# Patient Record
Sex: FEMALE | Race: BLACK OR AFRICAN AMERICAN | NOT HISPANIC OR LATINO | Employment: FULL TIME | URBAN - METROPOLITAN AREA
[De-identification: names, ages, dates, MRNs, and addresses within clinical notes are randomized per-mention and may not be internally consistent; named-entity substitution may affect disease eponyms.]

---

## 2018-11-24 ENCOUNTER — HOSPITAL ENCOUNTER (EMERGENCY)
Facility: HOSPITAL | Age: 33
Discharge: HOME | End: 2018-11-24
Attending: EMERGENCY MEDICINE
Payer: COMMERCIAL

## 2018-11-24 VITALS
HEART RATE: 97 BPM | RESPIRATION RATE: 16 BRPM | BODY MASS INDEX: 27.89 KG/M2 | TEMPERATURE: 99 F | SYSTOLIC BLOOD PRESSURE: 122 MMHG | HEIGHT: 68 IN | WEIGHT: 184 LBS | DIASTOLIC BLOOD PRESSURE: 67 MMHG | OXYGEN SATURATION: 100 %

## 2018-11-24 DIAGNOSIS — S99.912A LEFT ANKLE INJURY: ICD-10-CM

## 2018-11-24 DIAGNOSIS — S93.402A SPRAIN OF LEFT ANKLE, UNSPECIFIED LIGAMENT, INITIAL ENCOUNTER: Primary | ICD-10-CM

## 2018-11-24 PROCEDURE — 99283 EMERGENCY DEPT VISIT LOW MDM: CPT | Mod: 25

## 2018-11-24 RX ORDER — HYDROCODONE BITARTRATE AND ACETAMINOPHEN 5; 325 MG/1; MG/1
1 TABLET ORAL EVERY 8 HOURS PRN
Qty: 12 TABLET | Refills: 0 | Status: SHIPPED | OUTPATIENT
Start: 2018-11-24 | End: 2019-08-05

## 2018-11-24 NOTE — ED PROVIDER NOTES
HISTORY     Chief Complaint   Patient presents with    Ankle Pain     left ankle pain related to fall tonight. Pt states that she is 10 wks pregnant     Review of patient's allergies indicates:  No Known Allergies     HPI   The history is provided by the patient.   Ankle Pain   This is a new problem. The current episode started 1 to 2 hours ago. The problem occurs constantly. The problem has not changed since onset.Pertinent negatives include no chest pain and no shortness of breath. The symptoms are aggravated by bending, twisting and walking.   Slip trip fall after getting off couch tonight, only c/o L ankle pain and swelling    PCP: Primary Doctor No     Past Medical History:  No past medical history on file.     Past Surgical History:  No past surgical history on file.     Family History:  No family history on file.     Social History:  Social History     Tobacco Use    Smoking status: Not on file   Substance and Sexual Activity    Alcohol use: Not on file    Drug use: Not on file    Sexual activity: Not on file         ROS   Review of Systems   Constitutional: Negative for fever.   HENT: Negative for sore throat.    Respiratory: Negative for shortness of breath.    Cardiovascular: Negative for chest pain.   Gastrointestinal: Negative for nausea.   Genitourinary: Negative for dysuria.   Musculoskeletal: Negative for back pain.        + L ankle pain and swelling     Skin: Negative for rash.   Neurological: Negative for weakness.   Hematological: Does not bruise/bleed easily.   All other systems reviewed and are negative.      PHYSICAL EXAM     Initial Vitals [11/24/18 0140]   BP Pulse Resp Temp SpO2   122/67 97 16 98.6 °F (37 °C) 100 %      MAP       --           Physical Exam    Constitutional: She appears well-developed and well-nourished. She is not diaphoretic. No distress.   HENT:   Head: Normocephalic and atraumatic.   Eyes: Conjunctivae and EOM are normal. Pupils are equal, round, and reactive to  light.   Neck: Normal range of motion. Neck supple.   Cardiovascular: Normal rate, regular rhythm and normal heart sounds.   No murmur heard.  Pulmonary/Chest: Breath sounds normal. No respiratory distress. She has no wheezes. She has no rales.   Abdominal: Soft. Bowel sounds are normal. There is no tenderness. There is no rebound and no guarding.   Musculoskeletal: She exhibits no edema.        Left ankle: She exhibits decreased range of motion and swelling. She exhibits no deformity and normal pulse. Tenderness. Lateral malleolus and medial malleolus tenderness found.        Feet:    Neurological: She is alert and oriented to person, place, and time. No cranial nerve deficit. GCS score is 15. GCS eye subscore is 4. GCS verbal subscore is 5. GCS motor subscore is 6.   Skin: Skin is warm and dry. Capillary refill takes less than 2 seconds.   Psychiatric: She has a normal mood and affect. Thought content normal.          ED COURSE   Orthopedic Injury  Date/Time: 11/24/2018 3:05 AM  Performed by: Gary Daniels Jr., FNP  Authorized by: Rod Cruz MD     Consent Done?:  Yes  Universal Protocol:     Verbal consent obtained?: Yes      Consent given by:  Patient  Injury:     Injury location:  Ankle    Location details:  Left ankle    Injury type:  Soft tissue      Pre-procedure assessment:     Neurovascular status: Neurovascularly intact      Distal perfusion: normal      Neurological function: normal      Range of motion: reduced      Local anesthesia used?: No      Patient sedated?: No        Selections made in this section will also lock the Injury type section above.:     Supplies used:  Elastic bandage  Post-procedure assessment:     Neurovascular status: Neurovascularly intact      Distal perfusion: normal      Neurological function: normal      Range of motion: unchanged      Patient tolerance:  Patient tolerated the procedure well with no immediate complications      ED ONGOING VITALS:  Vitals:     "11/24/18 0140   BP: 122/67   Pulse: 97   Resp: 16   Temp: 98.6 °F (37 °C)   TempSrc: Oral   SpO2: 100%   Weight: 83.5 kg (184 lb)   Height: 5' 8" (1.727 m)         ABNORMAL LAB VALUES:  Labs Reviewed - No data to display      ALL LAB VALUES:        RADIOLOGY STUDIES:  Imaging Results          X-Ray Ankle Complete Left (Final result)  Result time 11/24/18 06:34:06    Final result by Rod Davis III, MD (11/24/18 06:34:06)                 Impression:      No acute bony abnormality suggested.  Soft tissue swelling, greatest laterally.      Electronically signed by: Rod Davis MD  Date:    11/24/2018  Time:    06:34             Narrative:    EXAMINATION:  XR ANKLE COMPLETE 3 VIEW LEFT    CLINICAL HISTORY:  Unspecified injury of left ankle, initial encounter    COMPARISON:  None    FINDINGS:  Soft tissue swelling about the ankle, greatest laterally.  No fracture.  No dislocation.                              Type of Interpretation: ED Physician (Independently Interpreted).  Radiology Procedure Done: Left Ankle.  Interpretation: No acute fracture or dislocation noted               The above vital signs and test results have been reviewed by the emergency provider.     ED Medications:  There are no discharge medications for this patient.    Discharge Medications:  This SmartLink is deprecated. Use AVSMEDLIST instead to display the medication list for a patient.   Follow-up Information     Primary doctor In 3 days.                3:05 AM: Reassessed pt at this time.  Pt states her condition has improved at this time. Discussed with pt all pertinent ED information and results. Discussed pt dx and plan of tx. Gave pt all f/u and return to the ED instructions. All questions and concerns were addressed at this time. Pt expresses understanding of information and instructions, and is comfortable with plan to discharge. Pt is stable for discharge.    I discussed with patient and/or family/caretaker that negative " X-ray does not rule out occult fracture or other soft tissue injury.  Persistent pain greater than 7-10 days or increased pain requires follow up, specifically with orthopedics.        MEDICAL DECISION MAKING                 CLINICAL IMPRESSION       ICD-10-CM ICD-9-CM   1. Sprain of left ankle, unspecified ligament, initial encounter S93.402A 845.00   2. Left ankle injury S99.912A 959.7               Gary Daniels Jr., FNP  11/24/18 2023

## 2018-11-24 NOTE — ED NOTES
Patient identifiers verified and correct for Tigist Blanco.    Patient states a fall earlier tonight, denies LOC or head injury. C/o left ankle pain and swelling    LOC: The patient is awake, alert and aware of environment with an appropriate affect, the patient is oriented x 3 and speaking appropriately.  APPEARANCE: Patient resting comfortably and in no acute distress, patient is clean and well groomed, patient's clothing is properly fastened.  SKIN: The skin is warm and dry, color consistent with ethnicity, patient has normal skin turgor and moist mucus membranes, skin intact, no breakdown or bruising noted.  MUSCULOSKELETAL: Patient moving all extremities spontaneously, left ankle pain and swelling   RESPIRATORY: Airway is open and patent, respirations are spontaneous.  CARDIAC: Patient has a normal rate, no periphreal edema noted, capillary refill < 3 seconds.  ABDOMEN: Soft and non tender to palpation.

## 2018-11-24 NOTE — ED NOTES
Bed:  01  Expected date:   Expected time:   Means of arrival:   Comments:     Carmen Logan RN  11/24/18 0244

## 2019-08-04 ENCOUNTER — HOSPITAL ENCOUNTER (EMERGENCY)
Facility: HOSPITAL | Age: 34
Discharge: HOME | End: 2019-08-05
Attending: EMERGENCY MEDICINE
Payer: COMMERCIAL

## 2019-08-04 DIAGNOSIS — R09.1 PLEURISY: Primary | ICD-10-CM

## 2019-08-04 DIAGNOSIS — R07.9 CHEST PAIN: ICD-10-CM

## 2019-08-04 PROCEDURE — 93005 ELECTROCARDIOGRAM TRACING: CPT

## 2019-08-04 PROCEDURE — 85025 COMPLETE CBC W/AUTO DIFF WBC: CPT

## 2019-08-04 PROCEDURE — 83690 ASSAY OF LIPASE: CPT

## 2019-08-04 PROCEDURE — 96374 THER/PROPH/DIAG INJ IV PUSH: CPT

## 2019-08-04 PROCEDURE — 63600175 PHARM REV CODE 636 W HCPCS: Performed by: EMERGENCY MEDICINE

## 2019-08-04 PROCEDURE — 80053 COMPREHEN METABOLIC PANEL: CPT

## 2019-08-04 PROCEDURE — 93010 EKG 12-LEAD: ICD-10-PCS | Mod: ,,, | Performed by: INTERNAL MEDICINE

## 2019-08-04 PROCEDURE — 25000003 PHARM REV CODE 250: Performed by: EMERGENCY MEDICINE

## 2019-08-04 PROCEDURE — 99285 EMERGENCY DEPT VISIT HI MDM: CPT | Mod: 25

## 2019-08-04 PROCEDURE — 93010 ELECTROCARDIOGRAM REPORT: CPT | Mod: ,,, | Performed by: INTERNAL MEDICINE

## 2019-08-04 PROCEDURE — 84484 ASSAY OF TROPONIN QUANT: CPT

## 2019-08-04 RX ORDER — KETOROLAC TROMETHAMINE 30 MG/ML
15 INJECTION, SOLUTION INTRAMUSCULAR; INTRAVENOUS
Status: COMPLETED | OUTPATIENT
Start: 2019-08-04 | End: 2019-08-04

## 2019-08-04 RX ORDER — ASPIRIN 325 MG
325 TABLET ORAL
Status: COMPLETED | OUTPATIENT
Start: 2019-08-04 | End: 2019-08-04

## 2019-08-04 RX ORDER — ONDANSETRON 2 MG/ML
4 INJECTION INTRAMUSCULAR; INTRAVENOUS
Status: DISCONTINUED | OUTPATIENT
Start: 2019-08-04 | End: 2019-08-05 | Stop reason: HOSPADM

## 2019-08-04 RX ADMIN — KETOROLAC TROMETHAMINE 15 MG: 30 INJECTION, SOLUTION INTRAMUSCULAR at 11:08

## 2019-08-04 RX ADMIN — ASPIRIN 325 MG ORAL TABLET 325 MG: 325 PILL ORAL at 11:08

## 2019-08-05 VITALS
SYSTOLIC BLOOD PRESSURE: 145 MMHG | WEIGHT: 206.56 LBS | OXYGEN SATURATION: 100 % | TEMPERATURE: 98 F | BODY MASS INDEX: 31.3 KG/M2 | HEART RATE: 61 BPM | HEIGHT: 68 IN | RESPIRATION RATE: 20 BRPM | DIASTOLIC BLOOD PRESSURE: 89 MMHG

## 2019-08-05 LAB
ALBUMIN SERPL BCP-MCNC: 3.9 G/DL (ref 3.5–5.2)
ALP SERPL-CCNC: 84 U/L (ref 55–135)
ALT SERPL W/O P-5'-P-CCNC: 12 U/L (ref 10–44)
ANION GAP SERPL CALC-SCNC: 8 MMOL/L (ref 8–16)
AST SERPL-CCNC: 14 U/L (ref 10–40)
BASOPHILS # BLD AUTO: 0.03 K/UL (ref 0–0.2)
BASOPHILS NFR BLD: 0.5 % (ref 0–1.9)
BILIRUB SERPL-MCNC: 0.8 MG/DL (ref 0.1–1)
BUN SERPL-MCNC: 10 MG/DL (ref 6–20)
CALCIUM SERPL-MCNC: 9.1 MG/DL (ref 8.7–10.5)
CHLORIDE SERPL-SCNC: 109 MMOL/L (ref 95–110)
CO2 SERPL-SCNC: 24 MMOL/L (ref 23–29)
CREAT SERPL-MCNC: 0.9 MG/DL (ref 0.5–1.4)
DIFFERENTIAL METHOD: ABNORMAL
EOSINOPHIL # BLD AUTO: 0.4 K/UL (ref 0–0.5)
EOSINOPHIL NFR BLD: 6.5 % (ref 0–8)
ERYTHROCYTE [DISTWIDTH] IN BLOOD BY AUTOMATED COUNT: 14.9 % (ref 11.5–14.5)
EST. GFR  (AFRICAN AMERICAN): >60 ML/MIN/1.73 M^2
EST. GFR  (NON AFRICAN AMERICAN): >60 ML/MIN/1.73 M^2
GLUCOSE SERPL-MCNC: 86 MG/DL (ref 70–110)
HCT VFR BLD AUTO: 38.6 % (ref 37–48.5)
HGB BLD-MCNC: 12.1 G/DL (ref 12–16)
LIPASE SERPL-CCNC: 66 U/L (ref 4–60)
LYMPHOCYTES # BLD AUTO: 2.3 K/UL (ref 1–4.8)
LYMPHOCYTES NFR BLD: 40.1 % (ref 18–48)
MCH RBC QN AUTO: 26.4 PG (ref 27–31)
MCHC RBC AUTO-ENTMCNC: 31.3 G/DL (ref 32–36)
MCV RBC AUTO: 84 FL (ref 82–98)
MONOCYTES # BLD AUTO: 0.4 K/UL (ref 0.3–1)
MONOCYTES NFR BLD: 6.3 % (ref 4–15)
NEUTROPHILS # BLD AUTO: 2.7 K/UL (ref 1.8–7.7)
NEUTROPHILS NFR BLD: 46.6 % (ref 38–73)
PLATELET # BLD AUTO: 160 K/UL (ref 150–350)
PMV BLD AUTO: 11.7 FL (ref 9.2–12.9)
POTASSIUM SERPL-SCNC: 3.9 MMOL/L (ref 3.5–5.1)
PROT SERPL-MCNC: 7.1 G/DL (ref 6–8.4)
RBC # BLD AUTO: 4.58 M/UL (ref 4–5.4)
SODIUM SERPL-SCNC: 141 MMOL/L (ref 136–145)
TROPONIN I SERPL DL<=0.01 NG/ML-MCNC: <0.006 NG/ML (ref 0–0.03)
WBC # BLD AUTO: 5.83 K/UL (ref 3.9–12.7)

## 2019-08-05 PROCEDURE — 36415 COLL VENOUS BLD VENIPUNCTURE: CPT

## 2019-08-05 PROCEDURE — 25500020 PHARM REV CODE 255: Performed by: EMERGENCY MEDICINE

## 2019-08-05 RX ORDER — IBUPROFEN 600 MG/1
600 TABLET ORAL EVERY 8 HOURS
Refills: 1 | COMMUNITY
Start: 2019-06-27

## 2019-08-05 RX ORDER — MORPHINE SULFATE 4 MG/ML
4 INJECTION, SOLUTION INTRAMUSCULAR; INTRAVENOUS
Status: DISCONTINUED | OUTPATIENT
Start: 2019-08-05 | End: 2019-08-05 | Stop reason: HOSPADM

## 2019-08-05 RX ORDER — METHYLPREDNISOLONE 4 MG/1
TABLET ORAL
Qty: 1 PACKAGE | Refills: 0 | Status: SHIPPED | OUTPATIENT
Start: 2019-08-05

## 2019-08-05 RX ORDER — KETOROLAC TROMETHAMINE 10 MG/1
10 TABLET, FILM COATED ORAL EVERY 8 HOURS PRN
Qty: 9 TABLET | Refills: 0 | Status: SHIPPED | OUTPATIENT
Start: 2019-08-05

## 2019-08-05 RX ORDER — ONDANSETRON 2 MG/ML
4 INJECTION INTRAMUSCULAR; INTRAVENOUS
Status: DISCONTINUED | OUTPATIENT
Start: 2019-08-05 | End: 2019-08-05 | Stop reason: HOSPADM

## 2019-08-05 RX ADMIN — IOHEXOL 100 ML: 350 INJECTION, SOLUTION INTRAVENOUS at 12:08

## 2019-08-05 NOTE — DISCHARGE INSTRUCTIONS
Medrol Dosepak as anti-inflammatory.  Toradol for pain.  Follow up with her doctor upon return to use an.  Return as needed for any worsening symptoms, problems, questions or concerns

## 2019-08-05 NOTE — ED NOTES
"RN in patient room placing patient back on cardiac monitor, pulse ox and BP cuff. Patient returned from CT. Patient still states her pain is there. Patient refused Morphine and Zofran stating "I am breastfeeding and do not want to take anything". Infant son crying and patient initiates breastfeeding. Instructed patient to call for any needs. Will continue to monitor.   "

## 2019-08-05 NOTE — ED NOTES
Provided patient with discharge instructions. Explained to patient about diagnosis and prescribed medications. Patient's friend unhappy and not satisfied with discharge diagnosis and MD. RN tried to continue to explain discharge paper work and follow up with PCP. Patient's friend became loud in hallway, verbally abusive, and cursing to RN while walking out. The patient mother and friend continued to yell in lobby as witnessed by Dina in registration and BETZAIDA Will.

## 2019-08-05 NOTE — ED PROVIDER NOTES
"SCRIBE #1 NOTE: I, Pipo Durham, am scribing for, and in the presence of, Álvaro Villanueva Jr., MD. I have scribed the entire note.       History     Chief Complaint   Patient presents with    Chest Pain     chest "tightness" radiating down left arm. Pt had vaginal delivery 5 wks ago. Pt seen PCP and dx w/ HTN and PVC's. Pt seen at ER in [place]. Dx w/ angina. Pt denies relief in pain     Review of patient's allergies indicates:  No Known Allergies      History of Present Illness     HPI    8/4/2019, 10:36 PM  History obtained from the patient      History of Present Illness: Tigist Blanco is a 34 y.o. female patient with a PMHx of HTN who presents to the Emergency Department for evaluation of chest pain which onset gradually at 4pm today. Symptoms are constant and moderate in severity. No mitigating or exacerbating factors reported. No associated sxs reported. Pt had vaginal delivery 5 weeks ago. Patient denies any leg swelling, fever, chills, SOB, and all other sxs at this time. Prior Tx includes Ibuprofen. Pt lives in [place]. She recently saw her PCP for chest pain and was told to visit the ED if sxs continued. Pt states she is returning home to [place] on Friday. No further complaints or concerns at this time.  Patient has a history of hypertension for which she takes labetalol.  Patient denies any calf pain or swelling.  Patient has recent vaginal delivery.  Patient recent traveled here from [place].        Arrival mode: Personal vehicle    PCP: Primary Doctor No     Past Medical History:  History reviewed. No pertinent medical history.    Past Surgical History:  History reviewed. No pertinent surgical history.    Family History:  History reviewed. No pertinent family history.    Social History:  Social History Main Topics    Smoking status: Unknown if ever smoked    Smokeless tobacco: Unknown if ever used    Alcohol Use: Unknown drinking history    Drug Use: Unknown if ever used    Sexual " Activity: Unknown             Review of Systems     Review of Systems   Constitutional: Negative for chills and fever.   HENT: Negative for sore throat.    Respiratory: Negative for shortness of breath.    Cardiovascular: Positive for chest pain. Negative for leg swelling.   Gastrointestinal: Negative for nausea.   Genitourinary: Negative for dysuria.   Musculoskeletal: Negative for back pain.   Skin: Negative for rash.   Neurological: Negative for weakness.   Hematological: Does not bruise/bleed easily.   All other systems reviewed and are negative.       Physical Exam     Initial Vitals [08/04/19 2201]   BP Pulse Resp Temp SpO2   (!) 135/92 72 18 98 °F (36.7 °C) 99 %      MAP       --          Physical Exam  Nursing Notes and Vital Signs Reviewed.  Constitutional: Patient is in no acute distress. Well-developed and well-nourished.  Head: Atraumatic. Normocephalic.  Eyes: PERRL. EOM intact. Conjunctivae are not pale. No scleral icterus.  ENT: Mucous membranes are moist. Oropharynx is clear and symmetric.    Neck: Supple. Full ROM. No lymphadenopathy.  Cardiovascular: Regular rate. Regular rhythm. No murmurs, rubs, or gallops. Distal pulses are 2+ and symmetric.  Pulmonary/Chest: No respiratory distress. Clear to auscultation bilaterally. No wheezing or rales.  Abdominal: Soft and non-distended.  There is no tenderness.  No rebound, guarding, or rigidity. Good bowel sounds.  Genitourinary: No CVA tenderness.  No suprapubic tenderness.  Musculoskeletal: Reproducible tenderness to bilateral sternal borders. Moves all extremities. No obvious deformities. No edema. No calf tenderness. Negative Homans sign. No palpable cord.  Skin: Warm and dry.  Neurological:  Alert, awake, and appropriate.  Normal speech.  No acute focal neurological deficits are appreciated.  Psychiatric: Normal affect. Good eye contact. Appropriate in content.     ED Course   Procedures  ED Vital Signs:  Vitals:    08/04/19 2201 08/04/19 2325  "08/04/19 2350 08/05/19 0130   BP: (!) 135/92  (!) 157/88 (!) 145/89   Pulse: 72 (!) 51 61 61   Resp: 18  12 20   Temp: 98 °F (36.7 °C)      TempSrc: Oral      SpO2: 99%  99% 100%   Weight: 93.7 kg (206 lb 9.1 oz)      Height: 5' 8" (1.727 m)          Abnormal Lab Results:  Labs Reviewed   CBC W/ AUTO DIFFERENTIAL - Abnormal; Notable for the following components:       Result Value    Mean Corpuscular Hemoglobin 26.4 (*)     Mean Corpuscular Hemoglobin Conc 31.3 (*)     RDW 14.9 (*)     All other components within normal limits   LIPASE - Abnormal; Notable for the following components:    Lipase 66 (*)     All other components within normal limits   COMPREHENSIVE METABOLIC PANEL   TROPONIN I        All Lab Results:  Results for orders placed or performed during the hospital encounter of 08/04/19   CBC auto differential   Result Value Ref Range    WBC 5.83 3.90 - 12.70 K/uL    RBC 4.58 4.00 - 5.40 M/uL    Hemoglobin 12.1 12.0 - 16.0 g/dL    Hematocrit 38.6 37.0 - 48.5 %    Mean Corpuscular Volume 84 82 - 98 fL    Mean Corpuscular Hemoglobin 26.4 (L) 27.0 - 31.0 pg    Mean Corpuscular Hemoglobin Conc 31.3 (L) 32.0 - 36.0 g/dL    RDW 14.9 (H) 11.5 - 14.5 %    Platelets 160 150 - 350 K/uL    MPV 11.7 9.2 - 12.9 fL    Gran # (ANC) 2.7 1.8 - 7.7 K/uL    Lymph # 2.3 1.0 - 4.8 K/uL    Mono # 0.4 0.3 - 1.0 K/uL    Eos # 0.4 0.0 - 0.5 K/uL    Baso # 0.03 0.00 - 0.20 K/uL    Gran% 46.6 38.0 - 73.0 %    Lymph% 40.1 18.0 - 48.0 %    Mono% 6.3 4.0 - 15.0 %    Eosinophil% 6.5 0.0 - 8.0 %    Basophil% 0.5 0.0 - 1.9 %    Differential Method Automated    Comprehensive metabolic panel   Result Value Ref Range    Sodium 141 136 - 145 mmol/L    Potassium 3.9 3.5 - 5.1 mmol/L    Chloride 109 95 - 110 mmol/L    CO2 24 23 - 29 mmol/L    Glucose 86 70 - 110 mg/dL    BUN, Bld 10 6 - 20 mg/dL    Creatinine 0.9 0.5 - 1.4 mg/dL    Calcium 9.1 8.7 - 10.5 mg/dL    Total Protein 7.1 6.0 - 8.4 g/dL    Albumin 3.9 3.5 - 5.2 g/dL    Total Bilirubin " 0.8 0.1 - 1.0 mg/dL    Alkaline Phosphatase 84 55 - 135 U/L    AST 14 10 - 40 U/L    ALT 12 10 - 44 U/L    Anion Gap 8 8 - 16 mmol/L    eGFR if African American >60 >60 mL/min/1.73 m^2    eGFR if non African American >60 >60 mL/min/1.73 m^2   Lipase   Result Value Ref Range    Lipase 66 (H) 4 - 60 U/L   Troponin I   Result Value Ref Range    Troponin I <0.006 0.000 - 0.026 ng/mL         Imaging Results:  Imaging Results          CTA Chest Non-Coronary - PE Study (In process)                X-Ray Chest AP Portable (Final result)  Result time 08/04/19 22:56:10    Final result by Aamir Spears MD (08/04/19 22:56:10)                 Impression:      No acute abnormality.      Electronically signed by: Aamir Spears  Date:    08/04/2019  Time:    22:56             Narrative:    EXAMINATION:  XR CHEST AP PORTABLE    CLINICAL HISTORY:  chest pain;.    TECHNIQUE:  Single frontal portable view of the chest was performed.    COMPARISON:  None    FINDINGS:  Support devices: None    The lungs are clear, with normal appearance of pulmonary vasculature and no pleural effusion or pneumothorax.    The cardiac silhouette is normal in size. The hilar and mediastinal contours are unremarkable.    Bones are intact.                                 1:33 AM: Per STAT radiology, pt's CT results: Limited due to motion. No definite PE visualized. Possible mild pulmonary edema/infiltrates, versus hypoventilation or motion artifact. Small pleural effusions.            The EKG was ordered, reviewed, and independently interpreted by the ED provider.  Interpretation time: 2202  Rate: 61 BPM  Rhythm: normal sinus rhythm with sinus arrhythmia  Interpretation: No acute ST wave elevation. No STEMI.         The Emergency Provider reviewed the vital signs and test results, which are outlined above.     ED Discussion     1:38 AM: Reassessed pt at this time.  Pt states her condition has improved at this time. Discussed with pt all pertinent ED  information and results. Discussed pt dx and plan of tx. Gave pt all f/u and return to the ED instructions. All questions and concerns were addressed at this time. Pt expresses understanding of information and instructions, and is comfortable with plan to discharge. Pt is stable for discharge.    I discussed with patient and/or family/caretaker that evaluation in the ED does not suggest any emergent or life threatening medical conditions requiring immediate intervention beyond what was provided in the ED, and I believe patient is safe for discharge.  Regardless, an unremarkable evaluation in the ED does not preclude the development or presence of a serious of life threatening condition. As such, patient was instructed to return immediately for any worsening or change in current symptoms.    1:40 AM  Patient is stable nontoxic.  Patient has had symptoms for 12 hr with negative troponin.  EKG is normal.  The patient has pleuritic-type pain.  D-dimer was a bit elevated CT scan does not show a PE.  Will treat symptomatically for pleurisy is this is with patient clinically has.  She is stable safe for discharge in my opinion.  I discussed with the patient all findings as well as the plan of care.  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  She seems reliable.    ED Medication(s):  Medications   ondansetron injection 4 mg (4 mg Intravenous Not Given 19)   morphine injection 4 mg (4 mg Intravenous Not Given 19)   ondansetron injection 4 mg (4 mg Intravenous Not Given 19)   ketorolac injection 15 mg (15 mg Intravenous Given 19)   aspirin tablet 325 mg (325 mg Oral Given 19)   iohexol (OMNIPAQUE 350) injection 100 mL (100 mLs Intravenous Given 198)       New Prescriptions    KETOROLAC (TORADOL) 10 MG TABLET    Take 1 tablet (10 mg total) by mouth every 8 (eight) hours as needed for Pain.    METHYLPREDNISOLONE (MEDROL DOSEPACK) 4 MG TABLET    Take as directed on the  package.       Follow-up Information     Your Physician.                       Medical Decision Making:   Clinical Tests:   Lab Tests: Ordered and Reviewed  Radiological Study: Ordered and Reviewed  Medical Tests: Ordered and Reviewed             Scribe Attestation:   Scribe #1: I performed the above scribed service and the documentation accurately describes the services I performed. I attest to the accuracy of the note.     Attending:   Physician Attestation Statement for Scribe #1: I, Álvaro Villanueva Jr., MD, personally performed the services described in this documentation, as scribed by Pipo Durham, in my presence, and it is both accurate and complete.           Clinical Impression       ICD-10-CM ICD-9-CM   1. Pleurisy R09.1 511.0   2. Chest pain R07.9 786.50       Disposition:   Disposition: Discharged  Condition: Stable         Álvaro Villanueva Jr., MD  08/05/19 0140

## 2019-08-05 NOTE — ED NOTES
RN at bedside. Explained care and wait time for labs and ct scan. Patient's mother very unhappy and repediatly is vocal about how awful this is. RN tried to deescalate situation and offer patient any thing else she may need. Patient provided with blanket. Patient in ED bed with mother, friend, and infant son at bedside. Will continue to monitor.